# Patient Record
(demographics unavailable — no encounter records)

---

## 2025-07-24 NOTE — ASSESSMENT
[FreeTextEntry1] : Discussed the nature of her condition Today for acute symptoms we will administer cortisone injection to the left subacromial space Prescription for physical therapy is provided

## 2025-07-24 NOTE — HISTORY OF PRESENT ILLNESS
[Dull/Aching] : dull/aching [Localized] : localized [Tightness] : tightness [de-identified] :  07/24/2025: She is a right-hand-dominant female who presents for evaluation of 3-week history of left shoulder pain.  She denies injury.  She did see her primary care physician who [facility] which we are able to review and advised orthopedic consultation. She states limited range of motion with above shoulder level activities, and activities of rotation such as dressing and reaching behind them. Pt has pain that wakes them up at night. They have tried otc nsaids with minimal relief.  [FreeTextEntry1] : LFT shoulder  [FreeTextEntry5] : LFT shoulder pain developed 3 weeks ago.

## 2025-07-24 NOTE — PHYSICAL EXAM
[Left] : left shoulder [Sitting] : sitting [Moderate] : moderate [] : no swelling [FreeTextEntry9] : She tolerates near full range of motion with pain above shoulder level on forward elevation and is limited secondary to pain with internal rotation

## 2025-07-24 NOTE — DATA REVIEWED
[Outside X-rays] : outside x-rays [Left] : left [Shoulder] : shoulder [Report was reviewed and noted in the chart] : The report was reviewed and noted in the chart [I reviewed the films/CD and agree] : I reviewed the films/CD and agree [FreeTextEntry1] : [facility] impression left shoulder: Normal